# Patient Record
Sex: FEMALE
[De-identification: names, ages, dates, MRNs, and addresses within clinical notes are randomized per-mention and may not be internally consistent; named-entity substitution may affect disease eponyms.]

---

## 2024-09-23 ENCOUNTER — APPOINTMENT (OUTPATIENT)
Dept: PULMONOLOGY | Facility: CLINIC | Age: 19
End: 2024-09-23

## 2024-09-23 VITALS
WEIGHT: 163 LBS | RESPIRATION RATE: 14 BRPM | DIASTOLIC BLOOD PRESSURE: 72 MMHG | HEIGHT: 66 IN | BODY MASS INDEX: 26.2 KG/M2 | HEART RATE: 74 BPM | SYSTOLIC BLOOD PRESSURE: 116 MMHG | OXYGEN SATURATION: 98 %

## 2024-09-23 DIAGNOSIS — Z87.891 PERSONAL HISTORY OF NICOTINE DEPENDENCE: ICD-10-CM

## 2024-09-23 DIAGNOSIS — R06.02 SHORTNESS OF BREATH: ICD-10-CM

## 2024-09-23 DIAGNOSIS — Z78.9 OTHER SPECIFIED HEALTH STATUS: ICD-10-CM

## 2024-09-23 DIAGNOSIS — J45.909 UNSPECIFIED ASTHMA, UNCOMPLICATED: ICD-10-CM

## 2024-09-23 PROBLEM — Z00.00 ENCOUNTER FOR PREVENTIVE HEALTH EXAMINATION: Status: ACTIVE | Noted: 2024-09-23

## 2024-09-23 PROCEDURE — 99203 OFFICE O/P NEW LOW 30 MIN: CPT | Mod: 25

## 2024-09-23 PROCEDURE — 94010 BREATHING CAPACITY TEST: CPT

## 2024-09-23 RX ORDER — ALBUTEROL SULFATE 90 UG/1
108 (90 BASE) INHALANT RESPIRATORY (INHALATION)
Refills: 0 | Status: ACTIVE | COMMUNITY

## 2024-09-23 NOTE — HISTORY OF PRESENT ILLNESS
[TextBox_4] : Ms. Sparks is a 19 year old woman with a past medical history of asthma who presents today prior to enlisting in the .  She states she was diagnosed with asthma between 5-8 years old with some wheezing and shortness of breath with both physical activities and with rest at times and had been on an inhaler, but more recently hasn't had major breathing issues. She does note that she did need to stay in the hospital as a child on several occasions but doesn't recall being intubated. She did mention an episode of fainting 2-3 months ago for about 5 minutes but did smoke marijuana that day and thinks that was related to her not eating properly as well. She also notes significant nasal congestion which she has attributed to allergies.  Spirometry 9/23/2024: FEV1 4.21/126%, FVC 4.61/121%, ratio 91.

## 2024-09-23 NOTE — PHYSICAL EXAM
[No Acute Distress] : no acute distress [Normal Oropharynx] : normal oropharynx [Normal Appearance] : normal appearance [No Neck Mass] : no neck mass [Normal Rate/Rhythm] : normal rate/rhythm [Normal S1, S2] : normal s1, s2 [No Murmurs] : no murmurs [No Resp Distress] : no resp distress [Clear to Auscultation Bilaterally] : clear to auscultation bilaterally [No Abnormalities] : no abnormalities [Benign] : benign [No Clubbing] : no clubbing [No Edema] : no edema [Oriented x3] : oriented x3 [Normal Affect] : normal affect

## 2024-09-23 NOTE — ASSESSMENT
[FreeTextEntry1] : Ms. Sparks is a 19 year old woman with a past medical history of asthma who presents today prior to enlisting in the .  Based on her history, will plan on ordering an asthma/allergy panel today in addition to an echo to ensure that nothing with her heart is contributing. Will tentatively place an order for a follow-up in 6 months but her PFTs are very much reassuring and will continue her on the albuterol as needed for the time being.

## 2024-09-23 NOTE — REVIEW OF SYSTEMS
[Fever] : no fever [Chills] : no chills [Dry Eyes] : no dry eyes [Eye Irritation] : no eye irritation [Cough] : no cough [Sputum] : no sputum [SOB on Exertion] : no sob on exertion [Chest Discomfort] : no chest discomfort [Orthopnea] : no orthopnea [Hay Fever] : no hay fever [Nasal Discharge] : no nasal discharge [GERD] : no gerd [Nocturia] : no nocturia [Dysuria] : no dysuria [Arthralgias] : no arthralgias [Myalgias] : no myalgias [Raynaud] : no raynaud [Anemia] : no anemia [Headache] : no headache [Dizziness] : no dizziness [Depression] : no depression [Diabetes] : no diabetes

## 2024-10-09 ENCOUNTER — APPOINTMENT (OUTPATIENT)
Dept: CARDIOLOGY | Facility: CLINIC | Age: 19
End: 2024-10-09
Payer: MEDICAID

## 2024-10-09 PROCEDURE — 93306 TTE W/DOPPLER COMPLETE: CPT

## 2025-02-25 ENCOUNTER — NON-APPOINTMENT (OUTPATIENT)
Age: 20
End: 2025-02-25